# Patient Record
Sex: MALE | URBAN - METROPOLITAN AREA
[De-identification: names, ages, dates, MRNs, and addresses within clinical notes are randomized per-mention and may not be internally consistent; named-entity substitution may affect disease eponyms.]

---

## 2023-05-10 ENCOUNTER — OFFICE VISIT (OUTPATIENT)
Facility: LOCATION | Age: 33
End: 2023-05-10
Payer: COMMERCIAL

## 2023-05-10 DIAGNOSIS — H10.011 ACUTE FOLLICULAR CONJUNCTIVITIS, RIGHT EYE: Primary | ICD-10-CM

## 2023-05-10 PROCEDURE — 99203 OFFICE O/P NEW LOW 30 MIN: CPT

## 2023-05-10 RX ORDER — NEOMYCIN SULFATE, POLYMYXIN B SULFATE AND DEXAMETHASONE 3.5; 10000; 1 MG/G; [USP'U]/G; MG/G
OINTMENT OPHTHALMIC
Qty: 5 | Refills: 0 | Status: ACTIVE
Start: 2023-05-10

## 2023-05-10 ASSESSMENT — INTRAOCULAR PRESSURE: OD: 12

## 2023-05-10 NOTE — IMPRESSION/PLAN
Impression: Acute follicular conjunctivitis, right eye: H10.011. Plan: Pt educated on condition. Reinsured the pt that the condition is usually self limiting. Started pt on anti-biotic/steroid combo drop to help with comfort and coverage. Also recommended PF ATs QID. Informed pt that anything that came in close contact with the eye should be washed or discarded to prevent reinfection. Pt to notify clinic if symptoms do not improve or worsen. Pt expressed understanding. 

Rx: maxitrol gtt BID
RTC: 2 weeks or sooner; prn.